# Patient Record
Sex: FEMALE | Race: WHITE | NOT HISPANIC OR LATINO | URBAN - METROPOLITAN AREA
[De-identification: names, ages, dates, MRNs, and addresses within clinical notes are randomized per-mention and may not be internally consistent; named-entity substitution may affect disease eponyms.]

---

## 2022-05-02 ENCOUNTER — OUTPATIENT (OUTPATIENT)
Dept: OUTPATIENT SERVICES | Facility: HOSPITAL | Age: 72
LOS: 1 days | End: 2022-05-02

## 2022-05-05 DIAGNOSIS — R91.8 OTHER NONSPECIFIC ABNORMAL FINDING OF LUNG FIELD: ICD-10-CM

## 2022-05-05 DIAGNOSIS — Z01.812 ENCOUNTER FOR PREPROCEDURAL LABORATORY EXAMINATION: ICD-10-CM

## 2022-05-16 ENCOUNTER — OUTPATIENT (OUTPATIENT)
Dept: OUTPATIENT SERVICES | Facility: HOSPITAL | Age: 72
LOS: 1 days | End: 2022-05-16
Payer: MEDICARE

## 2022-05-16 PROCEDURE — 71045 X-RAY EXAM CHEST 1 VIEW: CPT | Mod: 26,77

## 2022-05-16 PROCEDURE — 88104 CYTOPATH FL NONGYN SMEARS: CPT | Mod: 26

## 2022-05-16 PROCEDURE — 88305 TISSUE EXAM BY PATHOLOGIST: CPT | Mod: 26

## 2022-05-16 PROCEDURE — 71045 X-RAY EXAM CHEST 1 VIEW: CPT | Mod: 26

## 2022-05-19 DIAGNOSIS — J98.11 ATELECTASIS: ICD-10-CM

## 2022-05-19 DIAGNOSIS — I10 ESSENTIAL (PRIMARY) HYPERTENSION: ICD-10-CM

## 2022-05-19 DIAGNOSIS — E07.9 DISORDER OF THYROID, UNSPECIFIED: ICD-10-CM

## 2022-05-19 DIAGNOSIS — E11.9 TYPE 2 DIABETES MELLITUS WITHOUT COMPLICATIONS: ICD-10-CM

## 2022-05-19 DIAGNOSIS — J44.9 CHRONIC OBSTRUCTIVE PULMONARY DISEASE, UNSPECIFIED: ICD-10-CM

## 2022-05-19 DIAGNOSIS — K21.9 GASTRO-ESOPHAGEAL REFLUX DISEASE WITHOUT ESOPHAGITIS: ICD-10-CM

## 2023-05-17 PROBLEM — Z00.00 ENCOUNTER FOR PREVENTIVE HEALTH EXAMINATION: Status: ACTIVE | Noted: 2023-05-17

## 2023-05-30 RX ORDER — PANTOPRAZOLE 40 MG/1
40 TABLET, DELAYED RELEASE ORAL
Refills: 0 | Status: ACTIVE | COMMUNITY

## 2023-05-30 RX ORDER — NAPROXEN 500 MG/1
500 TABLET ORAL
Refills: 0 | Status: ACTIVE | COMMUNITY

## 2023-05-30 RX ORDER — FLUTICASONE FUROATE, UMECLIDINIUM BROMIDE AND VILANTEROL TRIFENATATE 100; 62.5; 25 UG/1; UG/1; UG/1
100-62.5-25 POWDER RESPIRATORY (INHALATION)
Refills: 0 | Status: ACTIVE | COMMUNITY

## 2023-05-30 RX ORDER — SUMATRIPTAN 50 MG/1
50 TABLET, FILM COATED ORAL
Refills: 0 | Status: ACTIVE | COMMUNITY

## 2023-05-30 RX ORDER — CHROMIUM 200 MCG
TABLET ORAL
Refills: 0 | Status: ACTIVE | COMMUNITY

## 2023-05-30 RX ORDER — ATENOLOL 25 MG/1
25 TABLET ORAL
Refills: 0 | Status: ACTIVE | COMMUNITY

## 2023-05-30 RX ORDER — MOXIFLOXACIN HYDROCHLORIDE TABLETS, 400 MG 400 MG/1
400 TABLET, FILM COATED ORAL
Refills: 0 | Status: ACTIVE | COMMUNITY

## 2023-06-02 ENCOUNTER — APPOINTMENT (OUTPATIENT)
Dept: THORACIC SURGERY | Facility: CLINIC | Age: 73
End: 2023-06-02
Payer: MEDICARE

## 2023-06-02 VITALS
HEIGHT: 63 IN | BODY MASS INDEX: 17.93 KG/M2 | OXYGEN SATURATION: 100 % | DIASTOLIC BLOOD PRESSURE: 84 MMHG | TEMPERATURE: 98 F | SYSTOLIC BLOOD PRESSURE: 143 MMHG | HEART RATE: 74 BPM | WEIGHT: 101.2 LBS

## 2023-06-02 PROCEDURE — 99202 OFFICE O/P NEW SF 15 MIN: CPT

## 2023-06-02 NOTE — HISTORY OF PRESENT ILLNESS
[FreeTextEntry1] : Lyndsay Ortega is a 72 year old female who presents for a consultation, referred by Dr. Yun, for the evaluation of pulmonary nodules.\par \par Past medical history includes COPD.\par \par She denies shortness of breath or chest pain.

## 2023-06-02 NOTE — ASSESSMENT
[FreeTextEntry1] : Ms. Ortega has a right apical lung lesion that shows PET activity.  I am recommending a cat scan chest in three months for surveillance purposes.  She will return at that time.

## 2023-06-02 NOTE — DATA REVIEWED
[FreeTextEntry1] : NM PET/CT SKULL BASE TO MID THIGH performed at [facility] on 5/12/23:\par CHEST: Lung fields reveal right medial apical focal intense uptake max measuring SUV 1.2cm abutting the superior mediastinum. Right lower lobe medial small consolidative area with air bronchograms demonstrating a max SUV 3.2 with a more proximal small focus max SUV 2.5 measuring 1cm. COPD. No mediastinal or hilar lymph node atypical focal uptake. Normal variable physiologic uptake within the heart and heart vessels. Mild diffuse esophageal uptake. No discrete supraclavicular or axillary lymph node significant atypical activity noted.\par \par \par \par CT Chest w/o contrast performed at [facility] on 3/21/23\par IMPRESSION:\par Lateral segment right middle lobe atelectasis suggestive of obstructing lesion in that segmental bronchus. Correlation with bronchoscopy is suggested.

## 2023-06-02 NOTE — PHYSICAL EXAM
[General Appearance - Alert] : alert [General Appearance - In No Acute Distress] : in no acute distress [General Appearance - Well Nourished] : well nourished [Neck Appearance] : the appearance of the neck was normal [] : the neck was supple [Neck Cervical Mass (___cm)] : no neck mass was observed [Examination Of The Chest] : the chest was normal in appearance [Chest Visual Inspection Thoracic Asymmetry] : no chest asymmetry [Skin Color & Pigmentation] : normal skin color and pigmentation [Skin Turgor] : normal skin turgor [Oriented To Time, Place, And Person] : oriented to person, place, and time

## 2023-06-02 NOTE — CONSULT LETTER
[Dear  ___] : Dear  [unfilled], [Consult Letter:] : I had the pleasure of evaluating your patient, [unfilled]. [Please see my note below.] : Please see my note below. [Consult Closing:] : Thank you very much for allowing me to participate in the care of this patient.  If you have any questions, please do not hesitate to contact me. [Sincerely,] : Sincerely, [FreeTextEntry2] : Dr. Monty Yun MD [FreeTextEntry3] : Gary Pardo MD\par Director of Thoracic Surgery, [facility]\par  Cardiovascular & Thoracic Surgery\par [facility]\par [address] [place] [address]\[place] [address]\par Tel. [phone]\par Fax [phone]

## 2023-08-16 PROBLEM — J44.9 COPD (CHRONIC OBSTRUCTIVE PULMONARY DISEASE): Status: ACTIVE | Noted: 2023-05-30

## 2023-08-18 ENCOUNTER — APPOINTMENT (OUTPATIENT)
Dept: THORACIC SURGERY | Facility: CLINIC | Age: 73
End: 2023-08-18
Payer: MEDICARE

## 2023-08-18 VITALS
TEMPERATURE: 97.3 F | HEIGHT: 63 IN | WEIGHT: 98 LBS | HEART RATE: 73 BPM | DIASTOLIC BLOOD PRESSURE: 67 MMHG | BODY MASS INDEX: 17.36 KG/M2 | SYSTOLIC BLOOD PRESSURE: 122 MMHG | OXYGEN SATURATION: 97 %

## 2023-08-18 DIAGNOSIS — J44.9 CHRONIC OBSTRUCTIVE PULMONARY DISEASE, UNSPECIFIED: ICD-10-CM

## 2023-08-18 PROCEDURE — 99213 OFFICE O/P EST LOW 20 MIN: CPT

## 2023-08-18 NOTE — PHYSICAL EXAM
[Neck Appearance] : the appearance of the neck was normal [Neck Cervical Mass (___cm)] : no neck mass was observed [] : no respiratory distress [Respiration, Rhythm And Depth] : normal respiratory rhythm and effort [Exaggerated Use Of Accessory Muscles For Inspiration] : no accessory muscle use [Examination Of The Chest] : the chest was normal in appearance [Chest Visual Inspection Thoracic Asymmetry] : no chest asymmetry [Cervical Lymph Nodes Enlarged Posterior Bilaterally] : posterior cervical [Cervical Lymph Nodes Enlarged Anterior Bilaterally] : anterior cervical [Oriented To Time, Place, And Person] : oriented to person, place, and time

## 2023-08-18 NOTE — ASSESSMENT
[FreeTextEntry1] : Ms. Ortega's PEt avid right sided lung nodule did not show growth compared to previous imaging.  I am recommending a surveillance cat scan chest in six months to assess for growth.

## 2023-08-18 NOTE — DATA REVIEWED
[FreeTextEntry1] : CT Scan of the chest from 8/10/23 at [facility] - Stable chronic segmental right middle lobe atelectasis. Bullous emphysema - Substantial resolution of a right paramedistinal soft tissue focus consistent with resolution of loculated pleural fluid or possibly resolving inflammatory changes.  No obvious neoplasm    NM PET/CT SKULL BASE TO MID THIGH performed at [facility] on 5/12/23:  CHEST: Lung fields reveal right medial apical focal intense uptake max measuring SUV 1.2cm abutting the superior mediastinum. Right lower lobe medial small consolidative area with air bronchograms demonstrating a max SUV 3.2 with a more proximal small focus max SUV 2.5 measuring 1cm. COPD. No mediastinal or hilar lymph node atypical focal uptake. Normal variable physiologic uptake within the heart and heart vessels. Mild diffuse esophageal uptake. No discrete supraclavicular or axillary lymph node significant atypical activity noted.

## 2023-08-24 ENCOUNTER — NON-APPOINTMENT (OUTPATIENT)
Age: 73
End: 2023-08-24

## 2023-08-24 ENCOUNTER — APPOINTMENT (OUTPATIENT)
Dept: OPHTHALMOLOGY | Facility: CLINIC | Age: 73
End: 2023-08-24
Payer: MEDICARE

## 2023-08-24 PROCEDURE — 92004 COMPRE OPH EXAM NEW PT 1/>: CPT

## 2024-02-13 PROBLEM — F17.200 CURRENT SMOKER: Status: ACTIVE | Noted: 2023-05-30

## 2024-02-13 PROBLEM — R91.8 PULMONARY NODULES: Status: ACTIVE | Noted: 2023-05-30

## 2024-02-14 NOTE — HISTORY OF PRESENT ILLNESS
[FreeTextEntry1] : Lyndsay Ortega is a 72 year old female who presents for a consultation, referred by Dr. Yun, for the evaluation of pulmonary nodules. Past medical history includes COPD.   She presents to the office today for CT surveillance review.  **NEED CT RESULTS IN CHART**

## 2024-02-14 NOTE — DATA REVIEWED
[FreeTextEntry1] : CT Scan of the chest from 8/10/23 at [facility] - Stable chronic segmental right middle lobe atelectasis. Bullous emphysema - Substantial resolution of a right paramedistinal soft tissue focus consistent with resolution of loculated pleural fluid or possibly resolving inflammatory changes. No obvious neoplasm    NM PET/CT SKULL BASE TO MID THIGH performed at [facility] on 5/12/23:  CHEST: Lung fields reveal right medial apical focal intense uptake max measuring SUV 1.2cm abutting the superior mediastinum. Right lower lobe medial small consolidative area with air bronchograms demonstrating a max SUV 3.2 with a more proximal small focus max SUV 2.5 measuring 1cm. COPD. No mediastinal or hilar lymph node atypical focal uptake. Normal variable physiologic uptake within the heart and heart vessels. Mild diffuse esophageal uptake. No discrete supraclavicular or axillary lymph node significant atypical activity noted.

## 2024-02-16 ENCOUNTER — APPOINTMENT (OUTPATIENT)
Dept: THORACIC SURGERY | Facility: CLINIC | Age: 74
End: 2024-02-16

## 2024-02-16 DIAGNOSIS — R91.8 OTHER NONSPECIFIC ABNORMAL FINDING OF LUNG FIELD: ICD-10-CM

## 2024-02-16 DIAGNOSIS — F17.200 NICOTINE DEPENDENCE, UNSPECIFIED, UNCOMPLICATED: ICD-10-CM

## 2024-08-07 PROBLEM — J98.11 ATELECTASIS: Status: ACTIVE | Noted: 2024-08-07
